# Patient Record
Sex: FEMALE | Race: WHITE | NOT HISPANIC OR LATINO | URBAN - METROPOLITAN AREA
[De-identification: names, ages, dates, MRNs, and addresses within clinical notes are randomized per-mention and may not be internally consistent; named-entity substitution may affect disease eponyms.]

---

## 2023-11-18 ENCOUNTER — OFFICE VISIT (OUTPATIENT)
Dept: URGENT CARE | Facility: CLINIC | Age: 38
End: 2023-11-18
Payer: COMMERCIAL

## 2023-11-18 VITALS
DIASTOLIC BLOOD PRESSURE: 118 MMHG | OXYGEN SATURATION: 99 % | WEIGHT: 105 LBS | RESPIRATION RATE: 18 BRPM | TEMPERATURE: 97.8 F | SYSTOLIC BLOOD PRESSURE: 160 MMHG | HEART RATE: 118 BPM

## 2023-11-18 DIAGNOSIS — J02.9 SORE THROAT: Primary | ICD-10-CM

## 2023-11-18 DIAGNOSIS — J02.0 STREPTOCOCCAL SORE THROAT: ICD-10-CM

## 2023-11-18 LAB — POC RAPID STREP: POSITIVE

## 2023-11-18 PROCEDURE — 99203 OFFICE O/P NEW LOW 30 MIN: CPT | Performed by: FAMILY MEDICINE

## 2023-11-18 PROCEDURE — 1036F TOBACCO NON-USER: CPT | Performed by: FAMILY MEDICINE

## 2023-11-18 PROCEDURE — 87880 STREP A ASSAY W/OPTIC: CPT | Performed by: FAMILY MEDICINE

## 2023-11-18 RX ORDER — AZITHROMYCIN 250 MG/1
TABLET, FILM COATED ORAL
Qty: 6 TABLET | Refills: 0 | Status: SHIPPED | OUTPATIENT
Start: 2023-11-18 | End: 2023-11-23

## 2023-11-18 NOTE — PROGRESS NOTES
Subjective   Patient ID: Gloira Domínguez is a 38 y.o. female.    HPI    The following portions of the chart were reviewed this encounter and updated as appropriate:     Sore throat for the past 1 day.  No fever or chills.  Painful swallowing.  Using Cepacol cough drops.  Hydrating well.  No sick contacts.  Mild postnasal drip.  No congestion or cough.  No chest congestion.  No other complaints or symptoms.  Allergies to penicillin.        Review of Systems  Objective   Physical Exam    Constitutional: vital signs reviewed. Well developed, well nourished. patient alert and patient without distress.   Head and Face: Normal and atraumatic.    Ears, Nose, Mouth, and Throat:   Hearing: Normal.  External inspection of nose: Normal.   Lips, teeth, tongue and gums: Normal and well hydrated. External inspection of ears: Normal. Ear canals and TMs: Normal.  Posterior pharynx moist and erythematous  Neck: No neck mass was observed. Supple. normal muscle tone.   Cardiovascular: Heart rate normal, normal S1 and S2, no gallops, no murmurs and no pericardial rub. Rhythm: Normal.  Pulmonary: No respiratory distress. Palpation of chest: Normal. Clear bilateral breath sounds.   Lymphatic: Small anterior cervical lymphadenopathy  Psych: Normal mood and affect    Procedures    Assessment/Plan

## 2023-11-18 NOTE — PATIENT INSTRUCTIONS
You have strep throat. Symptoms may last for up to 1-2 weeks, but follow up with PCP if your symptoms start worsening.  For muscle aches, fever, chills: Acetaminophen or Ibuprofen can be used.  Hydration: Maintain adequate hydration with water.  Nasal symptoms: Nasal Saline available over-the-counter, can be used 3-4 times per day  Decongestants reduce nasal congestion and discharge  Cool Mist Humidifier may loosens discharge  Cough Suppressants or expectorants may be taken over-the-counter including Robitussin-DM or Delsym.    You will be started on antibiotic which will be sent to the pharmacy; please complete the regimen as directed even if your symptoms improve. It is recommended to take an Probiotic while on this medication to reduce symptoms of upset stomach, diarrhea, and in women, yeast infections.     Replace your toothbrush in 48 hours